# Patient Record
(demographics unavailable — no encounter records)

---

## 2025-03-12 NOTE — CONSULT LETTER
[Dear  ___] : Dear  [unfilled], [Consult Letter:] : I had the pleasure of evaluating your patient, [unfilled]. [Please see my note below.] : Please see my note below. [Consult Closing:] : Thank you very much for allowing me to participate in the care of this patient.  If you have any questions, please do not hesitate to contact me. [Sincerely,] : Sincerely, [FreeTextEntry3] : Jamari Nolasco MD [facility] Associated Professor, [employer]

## 2025-03-12 NOTE — PHYSICAL EXAM
[Nasal Endoscopy Performed] : nasal endoscopy was performed, see procedure section for findings [] : septum deviated bilaterally [Midline] : trachea located in midline position [Normal] : no rashes [de-identified] : The bilateral inferior nasal turbinates are moderately hypertrophic and edematous at baseline, causing moderate obstruction to the nasal cavity

## 2025-03-12 NOTE — HISTORY OF PRESENT ILLNESS
[de-identified] : Patient comes in with hearing issues in the right ear, had an MRI and was told that it was abnormal. Basically she reports that she had an abdominal surgery in 2017 and was told that she had a hearing loss after this based on a hearing test they did to get a baseline. She didnt do anything about this until last year she started to be affected by the right ear hearing loss. She also had intermittent noises in the right ear that have worsened. She reports sometimes she hears better than others in the right ear

## 2025-03-12 NOTE — ASSESSMENT
[FreeTextEntry1] : Patient 46-year-old female gives a history of having a known asymmetrical hearing loss was worked up at [facility] with an MRI no acoustic neuroma as per her history.  She comes in today to have her hearing checked.  On examination ears are perfectly normal as has a little tympanosclerosis of the right tympanic membrane but no infection and no wax.  She also has some congestion endoscopically nasally she has swollen turbinates put her on Flonase nasal spray that certainly will help her.  MRI audiogram was performed she is not quite interested in a hearing aid nor does she think she needs one at this time I do recommend she follow-up on an annual basis.  Patient will fax this over the report of her MRI audio confirm an asymmetrical hearing loss she will be clear for hearing aid if the MRI report does indeed confirm no acoustic neuroma.

## 2025-03-12 NOTE — END OF VISIT
[FreeTextEntry3] : I, Dr. Nolasco personally performed the evaluation and management (E/M) services including all necessary procedures, for this new patient. That E/M includes conducting the clinically appropriate initial history &/or exam, assessing all conditions, and establishing the plan of care. Today, my MAURICIO, Mirian Grullon, was here to observe &/or participate in the visit & follow plan of care established by me.

## 2025-04-15 NOTE — REASON FOR VISIT
[Initial Evaluation] : an initial evaluation for [FreeTextEntry2] : ETD and asymmetrical hearing loss

## 2025-04-15 NOTE — ASSESSMENT
[FreeTextEntry1] : Otoscopic exam shows intact bilateral tympanic membranes with mild bilateral myringosclerosis changes.  No retraction, no effusion.  Bilateral facial nerve function symmetric.  I reviewed an audiogram which shows an asymmetric right worse than left sensorineural hearing loss.  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  Thomas does not lateralize and Rinne test shows air conduction greater than bone conduction bilaterally.  Check MRI IAC given asymmetric nature sensorineural hearing loss.  Discussed concern that cochlear otosclerosis may be responsible for patient's sensorineural hearing loss.  Hearing aid trial bilaterally.  Asked patient to send CT images to our office for review.

## 2025-04-15 NOTE — PROCEDURE
[FreeTextEntry3] : Procedure note:  Binocular microscopy  Apr 15, 2025   Inspection of the ears was performed under binocular microscopy because of need to accurately visualize otologic landmarks and potential pathologic conditions that would not otherwise be visible through simple otoscopic exam.

## 2025-06-19 NOTE — HISTORY OF PRESENT ILLNESS
[FreeTextEntry1] : Patient is a 46 year old female who has been seen by Nataliya Sprague and Gaurav for ENT visits. Patient reports right ear hearing loss and abnormal imaging in the past. Per note she reports intermittent tinnitus, non-pulsatile and constant ear itch. Hearing is diminished to right ear- deferring hearing aids. Patient denies otalgia, otorrhea, ear infections, bleeding, ear surgeries, dizziness, vertigo, headaches related to hearing. Most recent hearing test results show normal to moderately-severe SNHL in the left ear and moderately-severe to profound SNHL in the right ear. Patient has been medically cleared for hearing aids.